# Patient Record
Sex: FEMALE | Race: AMERICAN INDIAN OR ALASKA NATIVE
[De-identification: names, ages, dates, MRNs, and addresses within clinical notes are randomized per-mention and may not be internally consistent; named-entity substitution may affect disease eponyms.]

---

## 2018-01-08 ENCOUNTER — HOSPITAL ENCOUNTER (EMERGENCY)
Dept: HOSPITAL 5 - ED | Age: 78
Discharge: LEFT BEFORE DISCHARGE | End: 2018-01-08
Payer: MEDICARE

## 2018-01-08 ENCOUNTER — HOSPITAL ENCOUNTER (EMERGENCY)
Dept: HOSPITAL 5 - ED | Age: 78
LOS: 1 days | Discharge: LEFT BEFORE DISCHARGE | End: 2018-01-09
Payer: MEDICARE

## 2018-01-08 DIAGNOSIS — Z53.21: Primary | ICD-10-CM

## 2018-01-08 LAB
APTT BLD: 34.4 SEC. (ref 24.2–36.6)
BASOPHILS # (AUTO): 0 K/MM3 (ref 0–0.1)
BASOPHILS NFR BLD AUTO: 0.2 % (ref 0–1.8)
BUN SERPL-MCNC: 27 MG/DL (ref 7–17)
BUN/CREAT SERPL: 27 %
CALCIUM SERPL-MCNC: 9.6 MG/DL (ref 8.4–10.2)
EOSINOPHIL # BLD AUTO: 0 K/MM3 (ref 0–0.4)
EOSINOPHIL NFR BLD AUTO: 0.4 % (ref 0–4.3)
HCT VFR BLD CALC: 47.1 % (ref 30.3–42.9)
HEMOLYSIS INDEX: 8
HGB BLD-MCNC: 15.5 GM/DL (ref 10.1–14.3)
INR PPP: 1.01 (ref 0.87–1.13)
LYMPHOCYTES # BLD AUTO: 2.8 K/MM3 (ref 1.2–5.4)
LYMPHOCYTES NFR BLD AUTO: 40.2 % (ref 13.4–35)
MCH RBC QN AUTO: 31 PG (ref 28–32)
MCHC RBC AUTO-ENTMCNC: 33 % (ref 30–34)
MCV RBC AUTO: 93 FL (ref 79–97)
MONOCYTES # (AUTO): 0.6 K/MM3 (ref 0–0.8)
MONOCYTES % (AUTO): 8.2 % (ref 0–7.3)
PLATELET # BLD: 173 K/MM3 (ref 140–440)
RBC # BLD AUTO: 5.06 M/MM3 (ref 3.65–5.03)

## 2018-01-08 PROCEDURE — 85670 THROMBIN TIME PLASMA: CPT

## 2018-01-08 PROCEDURE — 70450 CT HEAD/BRAIN W/O DYE: CPT

## 2018-01-08 PROCEDURE — 85730 THROMBOPLASTIN TIME PARTIAL: CPT

## 2018-01-08 PROCEDURE — 80048 BASIC METABOLIC PNL TOTAL CA: CPT

## 2018-01-08 PROCEDURE — 93005 ELECTROCARDIOGRAM TRACING: CPT

## 2018-01-08 PROCEDURE — 85610 PROTHROMBIN TIME: CPT

## 2018-01-08 PROCEDURE — 84484 ASSAY OF TROPONIN QUANT: CPT

## 2018-01-08 PROCEDURE — 93010 ELECTROCARDIOGRAM REPORT: CPT

## 2018-01-08 PROCEDURE — 85025 COMPLETE CBC W/AUTO DIFF WBC: CPT

## 2018-01-08 PROCEDURE — 36415 COLL VENOUS BLD VENIPUNCTURE: CPT

## 2018-01-08 NOTE — CAT SCAN REPORT
FINAL REPORT



EXAM:  CT HEAD/BRAIN WO CON



HISTORY:  neuro deficits 



TECHNIQUE:  CT of the head was performed without intravenous

contrast. 



PRIORS:  None.



FINDINGS:  

The ventricles are normal in shape and position. The ventricles

are nondilated.  No intracranial hemorrhage, mass, mass effect,

midline shift or evidence of acute ischemic infarct. The basilar

cisterns are patent. Moderate to severe areas of low-attenuation

are seen in the periventricular and subcortical white matter.

Within the right occipital and posterior temporal regions, there

is a focal area of fluid attenuation with well-defined margins.

There is diffuse cerebral volume loss. Small focal area of

rounded low attenuation is seen in the right basal ganglia.



There is mucosal thickening of the left sphenoid sinus which is

likely congestive or inflammatory. The extracranial soft tissues

demonstrate no abnormality. The calvarium is intact. The orbits

are intact. The mastoid air cells are clear. 



IMPRESSION:  





1. Probable encephalomalacia in the right occipital and temporal

lobes from prior infarct. Acute or recent infarct or underlying

mass lesion are not completely excluded. Consider further

evaluation with MRI of the brain. 

2. 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 

3. Moderate to severe findings of chronic microvascular ischemic

disease and diffuse cerebral volume loss.

## 2018-01-09 VITALS — DIASTOLIC BLOOD PRESSURE: 72 MMHG | SYSTOLIC BLOOD PRESSURE: 156 MMHG

## 2018-07-06 ENCOUNTER — HOSPITAL ENCOUNTER (EMERGENCY)
Dept: HOSPITAL 5 - ED | Age: 78
Discharge: HOME | End: 2018-07-06
Payer: MEDICARE

## 2018-07-06 DIAGNOSIS — F41.9: ICD-10-CM

## 2018-07-06 DIAGNOSIS — I10: ICD-10-CM

## 2018-07-06 DIAGNOSIS — Z76.0: Primary | ICD-10-CM

## 2018-07-06 LAB
BACTERIA #/AREA URNS HPF: (no result) /HPF
BILIRUB UR QL STRIP: (no result)
BLOOD UR QL VISUAL: (no result)
MUCOUS THREADS #/AREA URNS HPF: (no result) /HPF
PH UR STRIP: 7 [PH] (ref 5–7)
PROT UR STRIP-MCNC: (no result) MG/DL
RBC #/AREA URNS HPF: 1 /HPF (ref 0–6)
UROBILINOGEN UR-MCNC: < 2 MG/DL (ref ?–2)
WBC #/AREA URNS HPF: 1 /HPF (ref 0–6)

## 2018-07-06 PROCEDURE — 99283 EMERGENCY DEPT VISIT LOW MDM: CPT

## 2018-07-06 PROCEDURE — 81001 URINALYSIS AUTO W/SCOPE: CPT

## 2018-07-06 NOTE — EMERGENCY DEPARTMENT REPORT
ED Recheck HPI





- General


Chief Complaint: Medical Clearance


Stated Complaint: MEDICATION REFILL


Time Seen by Provider: 18 17:18


Source: patient


Mode of arrival: Ambulatory


Limitations: No Limitations





- History of Present Illness


Initial Comments: 


78-year-old female past medical history PE arms are also, hypertension presents 

to the ED for prescription refill.  Patient states that while on a train from 

[place] to [place] she lost her prescriptions for Xarelto, 

hydrochlorothiazide and amlodipine.  Patient states she was diagnosed with a PE 

in 2018.  Patient is awake alert and oriented 3 not in acute distress.  

Denies any chest pain palpitations shortness of breath nausea vomiting fever 

chills flank pain and dysuria hematuria.  Patient informed Dr. Weeks earlier 

that she may have had slight increase in urinary frequency.  Wanted to be 

evaluated for a possible UTI.  Patient adamantly denies any flank pain or foul 

smelling urine.  Patient is not in any distress.  Denies any current chest pain 

or palpitations or shortness of breath.  Denies any pleuritic chest pain.


MD Complaint: medication refill request





- Related Data


 Home Medications











 Medication  Instructions  Recorded  Confirmed  Last Taken


 


ALPRAZolam [Xanax TAB] 0.25 mg PO DAILY PRN 16 Unknown


 


Aspirin [Adult Low Dose Aspirin EC] 81 mg PO DAILY 16 Unknown


 


HYDROcodone/APAP 5-325 1 tab PO Q6H 16 Unknown


 


Hydrochlorothiazide [HCTZ] 25 mg PO QDAY 16


 


Lovastatin [Altoprev] 40 mg PO QPM 16


 


Vitamin D3 Complete Caplet 1 tab PO DAILY 16








 Previous Rx's











 Medication  Instructions  Recorded  Last Taken  Type


 


Hydrochlorothiazide [HCTZ] 25 mg PO QDAY #30 tablet 18 Unknown Rx


 


Rivaroxaban [Xarelto] 20 mg PO QDAY #30 tab 18 Unknown Rx


 


amLODIPine [Norvasc] 10 mg PO DAILY #30 tab 18 Unknown Rx











 Allergies











Allergy/AdvReac Type Severity Reaction Status Date / Time


 


No Known Allergies Allergy   Verified 18 15:49














ED Review of Systems


ROS: 


Stated complaint: MEDICATION REFILL


Other details as noted in HPI





Constitutional: denies: chills, fever


Eyes: denies: eye pain, eye discharge, vision change


ENT: denies: ear pain, throat pain


Respiratory: other (history of PE).  denies: cough, shortness of breath, 

wheezing


Cardiovascular: denies: chest pain, palpitations


Endocrine: no symptoms reported


Gastrointestinal: denies: abdominal pain, nausea, diarrhea


Genitourinary: denies: urgency, dysuria, discharge


Musculoskeletal: denies: back pain, joint swelling, arthralgia


Skin: denies: rash, lesions


Neurological: denies: headache, weakness, paresthesias


Psychiatric: denies: anxiety, depression


Hematological/Lymphatic: denies: easy bleeding, easy bruising





ED Past Medical Hx





- Past Medical History


Previous Medical History?: Yes


Hx Hypertension: Yes


Hx Psychiatric Treatment:  (Anxiety)


Additional medical history: Vertigo, Chest pain, Sciatic nerve pain BLOOD CLOTS





- Surgical History


Additional Surgical History: Heart cath 2016, 





- Social History


Smoking Status: Never Smoker


Substance Use Type: None





- Medications


Home Medications: 


 Home Medications











 Medication  Instructions  Recorded  Confirmed  Last Taken  Type


 


ALPRAZolam [Xanax TAB] 0.25 mg PO DAILY PRN 16 Unknown History


 


Aspirin [Adult Low Dose Aspirin EC] 81 mg PO DAILY 16 Unknown 

History


 


HYDROcodone/APAP 5-325 1 tab PO Q6H 16 Unknown History


 


Hydrochlorothiazide [HCTZ] 25 mg PO QDAY 16 History


 


Lovastatin [Altoprev] 40 mg PO QPM 16 History


 


Vitamin D3 Complete Caplet 1 tab PO DAILY 16 History


 


Hydrochlorothiazide [HCTZ] 25 mg PO QDAY #30 tablet 18  Unknown Rx


 


Rivaroxaban [Xarelto] 20 mg PO QDAY #30 tab 18  Unknown Rx


 


amLODIPine [Norvasc] 10 mg PO DAILY #30 tab 18  Unknown Rx














ED Physical Exam





- General


Limitations: No Limitations


General appearance: alert, in no apparent distress





- Head


Head exam: Present: atraumatic, normocephalic





- Eye


Eye exam: Present: normal appearance, PERRL, EOMI





- ENT


ENT exam: Present: mucous membranes moist





- Neck


Neck exam: Present: normal inspection





- Respiratory


Respiratory exam: Present: normal lung sounds bilaterally.  Absent: respiratory 

distress





- Cardiovascular


Cardiovascular Exam: Present: regular rate, normal rhythm.  Absent: systolic 

murmur, diastolic murmur, rubs, gallop





- GI/Abdominal


GI/Abdominal exam: Present: soft, normal bowel sounds





- Extremities Exam


Extremities exam: Present: normal inspection





- Back Exam


Back exam: Present: normal inspection





- Neurological Exam


Neurological exam: Present: alert, oriented X3





- Psychiatric


Psychiatric exam: Present: normal affect, normal mood





- Skin


Skin exam: Present: warm, dry, intact, normal color.  Absent: rash





ED Course


 Vital Signs











  18





  15:49 19:49


 


Temperature 98.1 F 


 


Pulse Rate 75 


 


Respiratory 18 





Rate  


 


Blood Pressure 166/104 143/93


 


O2 Sat by Pulse 97 





Oximetry  














ED Recheck MDM





- Differential Diagnosis


Prescription Refill(s)





- Medical Decision Making


A/P: Urinalysis check, medication refill


1-patient informed me that she is on xarelto 20 mg daily hydrochlorothiazide 25 

mg daily and amlodipine 10 mg daily.  I provided patient with prescription for 

30 days each of these medicines.  Patient denied any other medicines at this 

time


2-as per my discussion with Dr. Weeks who initially evaluated the patient I 

followed up urinalysis.  It is unremarkable with no nitrites leukocytes WBCs or 

bacteria.  Patient states he has had increased urinary frequency for several 

months.  I advised patient that it would be important for her to follow-up with 

urology and primary care.  I referred her to both.  Patient states she'll be in 

the [facility] visiting her family for approximately one month.  Patient 

also states that she has doctors to follow-up with in [place]


3-vital signs stable for discharge.  Patient has no symptoms consistent with 

symptomatic hypertension or active symptomatic PE at this time.


 


Critical care attestation.: 


If time is entered above; I have spent that time in minutes in the direct care 

of this critically ill patient, excluding procedure time.








ED Disposition


Clinical Impression: 


 Medication refill, Encounter for urine test





Disposition:  TO HOME OR SELFCARE


Is pt being admited?: No


Does the pt Need Aspirin: No


Condition: Stable


Instructions:  Hydrochlorothiazide (By mouth), Amlodipine (By mouth), 

Rivaroxaban (By mouth)


Prescriptions: 


amLODIPine [Norvasc] 10 mg PO DAILY #30 tab


Hydrochlorothiazide [HCTZ] 25 mg PO QDAY #30 tablet


Rivaroxaban [Xarelto] 20 mg PO QDAY #30 tab


Referrals: 


[facility] [Provider Group] - 3-5 Days


[facility] [Provider Group] - 3-5 Days


Time of Disposition: 19:35

## 2018-07-06 NOTE — EMERGENCY DEPARTMENT REPORT
Blank Doc





- Documentation


Documentation: 





Patient is a 69-year-old  female who has a history of coronary bypass 

and hypertension who is here because she recently moved here from [place] 

and she lost her medications while on a train.  The patient states she needs 

refills of her Zaroxolyn she has not had an approximately 1 week.  Patient also 

needs her blood pressure medicines.  Patient states she has intermittent chest 

discomfort that is random which is not present today.  Patient also does have 

some urinary frequency and urgency.


Patient will be given refills of her meds but before leaving will check a 

urinalysis to make sure that she doesn't have a urinary tract infection.

## 2018-07-07 VITALS — DIASTOLIC BLOOD PRESSURE: 96 MMHG | SYSTOLIC BLOOD PRESSURE: 143 MMHG

## 2019-02-07 ENCOUNTER — HOSPITAL ENCOUNTER (EMERGENCY)
Dept: HOSPITAL 5 - ED | Age: 79
Discharge: HOME | End: 2019-02-07
Payer: MEDICARE

## 2019-02-07 VITALS — SYSTOLIC BLOOD PRESSURE: 144 MMHG | DIASTOLIC BLOOD PRESSURE: 99 MMHG

## 2019-02-07 DIAGNOSIS — Z79.82: ICD-10-CM

## 2019-02-07 DIAGNOSIS — Z87.891: ICD-10-CM

## 2019-02-07 DIAGNOSIS — I10: ICD-10-CM

## 2019-02-07 DIAGNOSIS — R20.0: Primary | ICD-10-CM

## 2019-02-07 LAB
BASOPHILS # (AUTO): 0 K/MM3 (ref 0–0.1)
BASOPHILS NFR BLD AUTO: 0.4 % (ref 0–1.8)
BUN SERPL-MCNC: 21 MG/DL (ref 7–17)
BUN/CREAT SERPL: 26 %
CALCIUM SERPL-MCNC: 9.4 MG/DL (ref 8.4–10.2)
EOSINOPHIL # BLD AUTO: 0 K/MM3 (ref 0–0.4)
EOSINOPHIL NFR BLD AUTO: 0.5 % (ref 0–4.3)
HCT VFR BLD CALC: 40.8 % (ref 30.3–42.9)
HEMOLYSIS INDEX: 18
HGB BLD-MCNC: 13.2 GM/DL (ref 10.1–14.3)
LYMPHOCYTES # BLD AUTO: 1.4 K/MM3 (ref 1.2–5.4)
LYMPHOCYTES NFR BLD AUTO: 27.9 % (ref 13.4–35)
MCHC RBC AUTO-ENTMCNC: 32 % (ref 30–34)
MCV RBC AUTO: 95 FL (ref 79–97)
MONOCYTES # (AUTO): 0.4 K/MM3 (ref 0–0.8)
MONOCYTES % (AUTO): 7.6 % (ref 0–7.3)
PLATELET # BLD: 166 K/MM3 (ref 140–440)
RBC # BLD AUTO: 4.32 M/MM3 (ref 3.65–5.03)

## 2019-02-07 PROCEDURE — 36415 COLL VENOUS BLD VENIPUNCTURE: CPT

## 2019-02-07 PROCEDURE — 83735 ASSAY OF MAGNESIUM: CPT

## 2019-02-07 PROCEDURE — 80048 BASIC METABOLIC PNL TOTAL CA: CPT

## 2019-02-07 PROCEDURE — 85025 COMPLETE CBC W/AUTO DIFF WBC: CPT

## 2019-02-07 PROCEDURE — 99283 EMERGENCY DEPT VISIT LOW MDM: CPT

## 2019-02-07 NOTE — EMERGENCY DEPARTMENT REPORT
ED General Adult HPI





- General


Chief complaint: Medical Clearance


Stated complaint: NUMBNESS


Time Seen by Provider: 19 11:29


Source: patient


Mode of arrival: Ambulatory


Limitations: No Limitations





- History of Present Illness


Initial comments: 





Patient is a 78-year-old Female who has a past medical history of hypertension 

as well as diabetes who is complaining of some left facial numbness.  The 

patient states she had a stroke in 2016 and it again at 2017 has had some 

persistent left facial numbness but she states for the last week she feels like 

it slightly worse.  Patient is able to drink and swallow she has no issues 

closing her eyes at night.  Patient states her arms and legs are unaffected and 

there's been no slurred speech.  Patient denies any headache or neck stiffness. 

Patient states there is no nausea vomiting diarrhea fevers or chills.





- Related Data


                                Home Medications











 Medication  Instructions  Recorded  Confirmed  Last Taken


 


ALPRAZolam [Xanax TAB] 0.25 mg PO DAILY PRN 16 Unknown


 


Aspirin [Adult Low Dose Aspirin EC] 81 mg PO DAILY 16 Unknown


 


HYDROcodone/APAP 5-325 1 tab PO Q6H 16 Unknown


 


Lovastatin [Altoprev] 40 mg PO QPM 16


 


Vitamin D3 Complete Caplet 1 tab PO DAILY 16


 


hydroCHLOROthiazide [HCTZ] 25 mg PO QDAY 16








                                  Previous Rx's











 Medication  Instructions  Recorded  Last Taken  Type


 


Rivaroxaban [Xarelto] 20 mg PO QDAY #30 tab 18 Unknown Rx


 


amLODIPine [Norvasc] 10 mg PO DAILY #30 tab 18 Unknown Rx


 


hydroCHLOROthiazide [HCTZ] 25 mg PO QDAY #30 tablet 18 Unknown Rx


 


Pregabalin [Lyrica] 25 mg PO BID #30 cap 19 Unknown Rx











                                    Allergies











Allergy/AdvReac Type Severity Reaction Status Date / Time


 


No Known Allergies Allergy   Verified 19 11:19














ED Review of Systems


ROS: 


Stated complaint: NUMBNESS


Other details as noted in HPI





Comment: All other systems reviewed and negative





ED Past Medical Hx





- Past Medical History


Hx Hypertension: Yes


Hx Psychiatric Treatment:  (Anxiety)


Additional medical history: Vertigo, Chest pain, Sciatic nerve pain BLOOD CLOTS





- Surgical History


Additional Surgical History: Heart cath 2016, 





- Social History


Smoking Status: Former Smoker


Substance Use Type: None





- Medications


Home Medications: 


                                Home Medications











 Medication  Instructions  Recorded  Confirmed  Last Taken  Type


 


ALPRAZolam [Xanax TAB] 0.25 mg PO DAILY PRN 16 Unknown History


 


Aspirin [Adult Low Dose Aspirin EC] 81 mg PO DAILY 16 Unknown 

History


 


HYDROcodone/APAP 5-325 1 tab PO Q6H 16 Unknown History


 


Lovastatin [Altoprev] 40 mg PO QPM 16 History


 


Vitamin D3 Complete Caplet 1 tab PO DAILY 16 History


 


hydroCHLOROthiazide [HCTZ] 25 mg PO QDAY 16 History


 


Rivaroxaban [Xarelto] 20 mg PO QDAY #30 tab 18  Unknown Rx


 


amLODIPine [Norvasc] 10 mg PO DAILY #30 tab 18  Unknown Rx


 


hydroCHLOROthiazide [HCTZ] 25 mg PO QDAY #30 tablet 18  Unknown Rx


 


Pregabalin [Lyrica] 25 mg PO BID #30 cap 19  Unknown Rx














ED Physical Exam





- General


Limitations: No Limitations


General appearance: alert, in no apparent distress





- Head


Head exam: Present: atraumatic, normocephalic





- Eye


Eye exam: Present: normal appearance





- ENT


ENT exam: Present: mucous membranes moist





- Neck


Neck exam: Present: normal inspection





- Respiratory


Respiratory exam: Present: normal lung sounds bilaterally.  Absent: respiratory 

distress, wheezes, rales, rhonchi





- Cardiovascular


Cardiovascular Exam: Present: regular rate, normal rhythm.  Absent: systolic 

murmur, diastolic murmur, rubs, gallop





- GI/Abdominal


GI/Abdominal exam: Present: soft, normal bowel sounds.  Absent: distended, 

tenderness, guarding





- Extremities Exam


Extremities exam: Present: normal inspection





- Back Exam


Back exam: Present: normal inspection





- Neurological Exam


Neurological exam: Present: alert, oriented X3, CN II-XII intact (patient is 

able to move the eyebrows upward normally with good preservation of the forehead

 lines across the forehead.  The patient can close his eyes tightly bilaterally 

her smile is symmetrical.  The patient subjectively does have some mild 

decreased sensation to the left face.), normal gait





- Psychiatric


Psychiatric exam: Present: normal affect, normal mood





- Skin


Skin exam: Present: warm, dry, intact, normal color.  Absent: rash





ED Course


                                   Vital Signs











  19





  11:29


 


Temperature 97.8 F


 


Pulse Rate 81


 


Respiratory 18





Rate 


 


Blood Pressure 144/99


 


O2 Sat by Pulse 94





Oximetry 














- Reevaluation(s)


Reevaluation #1: 





19 12:55


Patient is a 78-year-old  female with a past medical history of 2 years 

of left facial numbness however she states it slightly worse now than previous 

over the last week.  Patient has complete normal function of the left face.





Do not see any evidence of a facial palsy from a peripheral or central origin.  

Electrolytes to be checked specifically her magnesium potassium levels to ensure

 that she does not have an electrolyte [place] causing her paresthesias 

the patient be reassessed.





ED Medical Decision Making





- Lab Data


Result diagrams: 


                                 19 12:55





                                 19 12:55








                                   Lab Results











  19 Range/Units





  12:55 12:55 


 


WBC  5.1   (4.5-11.0)  K/mm3


 


RBC  4.32   (3.65-5.03)  M/mm3


 


Hgb  13.2   (10.1-14.3)  gm/dl


 


Hct  40.8   (30.3-42.9)  %


 


MCV  95   (79-97)  fl


 


MCH  31   (28-32)  pg


 


MCHC  32   (30-34)  %


 


RDW  12.5 L   (13.2-15.2)  %


 


Plt Count  166   (140-440)  K/mm3


 


Lymph % (Auto)  27.9   (13.4-35.0)  %


 


Mono % (Auto)  7.6 H   (0.0-7.3)  %


 


Eos % (Auto)  0.5   (0.0-4.3)  %


 


Baso % (Auto)  0.4   (0.0-1.8)  %


 


Lymph #  1.4   (1.2-5.4)  K/mm3


 


Mono #  0.4   (0.0-0.8)  K/mm3


 


Eos #  0.0   (0.0-0.4)  K/mm3


 


Baso #  0.0   (0.0-0.1)  K/mm3


 


Seg Neutrophils %  63.6   (40.0-70.0)  %


 


Seg Neutrophils #  3.3   (1.8-7.7)  K/mm3


 


Sodium   145  (137-145)  mmol/L


 


Potassium   4.1  (3.6-5.0)  mmol/L


 


Chloride   106.2  ()  mmol/L


 


Carbon Dioxide   26  (22-30)  mmol/L


 


Anion Gap   17  mmol/L


 


BUN   21 H  (7-17)  mg/dL


 


Creatinine   0.8  (0.7-1.2)  mg/dL


 


Estimated GFR   > 60  ml/min


 


BUN/Creatinine Ratio   26  %


 


Glucose   85  ()  mg/dL


 


Calcium   9.4  (8.4-10.2)  mg/dL


 


Magnesium   1.80  (1.7-2.3)  mg/dL














- Medical Decision Making





Patient is a 78-year-old Female who is having slight worsening of her chronic 

left-sided facial numbness.  Patient has a history of taking gabapentin but was 

taken off secondary to having some hallucinations.  Patient with further 

questioning he does have a neurologist who is scheduling her for MRI.  Because 

the patient has a neurologist with good follow-up and the symptoms have been 

present for approximately one week I did not feel as though this a CT scan in 

the emergency department as warranted.  Patient has been discharged home to 

follow with her neurologist.  Discussed giving the patient Lyrica which she 

states that she would like to try.  Patient discharged in stable condition.


Critical care attestation.: 


If time is entered above; I have spent that time in minutes in the direct care 

of this critically ill patient, excluding procedure time.








ED Disposition


Clinical Impression: 


 Paresthesia





Disposition: DC-01 TO HOME OR SELFCARE


Is pt being admited?: No


Does the pt Need Aspirin: No


Condition: Stable


Instructions:  Paresthesia (ED)


Prescriptions: 


Pregabalin [Lyrica] 25 mg PO BID #30 cap


Time of Disposition: 13:48

## 2019-02-07 NOTE — EMERGENCY DEPARTMENT REPORT
Blank Doc





- Documentation


Documentation: 





78-year-old female that presents with dry mouth and lips.  Patient also stated 

has some lip numbness. Patient denies any other complaints.  





Seems like some chopped lips


Normal neuro exam. No slurred speech. Normal muscle strength.


Will order labs 


Sent to [facility] for further evaluation and treatment